# Patient Record
Sex: MALE | Race: WHITE | NOT HISPANIC OR LATINO | URBAN - METROPOLITAN AREA
[De-identification: names, ages, dates, MRNs, and addresses within clinical notes are randomized per-mention and may not be internally consistent; named-entity substitution may affect disease eponyms.]

---

## 2017-01-10 ENCOUNTER — THERAPY VISIT (OUTPATIENT)
Dept: OCCUPATIONAL THERAPY | Facility: CLINIC | Age: 57
End: 2017-01-10
Payer: OTHER MISCELLANEOUS

## 2017-01-10 DIAGNOSIS — S62.635D CLOSED DISPLACED FRACTURE OF DISTAL PHALANX OF LEFT RING FINGER WITH ROUTINE HEALING, SUBSEQUENT ENCOUNTER: ICD-10-CM

## 2017-01-10 DIAGNOSIS — M79.645 PAIN OF FINGER OF LEFT HAND: Primary | ICD-10-CM

## 2017-01-10 DIAGNOSIS — S62.633D CLOSED DISPLACED FRACTURE OF DISTAL PHALANX OF LEFT MIDDLE FINGER WITH ROUTINE HEALING, SUBSEQUENT ENCOUNTER: ICD-10-CM

## 2017-01-10 DIAGNOSIS — M25.642 STIFFNESS OF LEFT HAND JOINT: ICD-10-CM

## 2017-01-10 PROCEDURE — 97165 OT EVAL LOW COMPLEX 30 MIN: CPT | Mod: GO | Performed by: OCCUPATIONAL THERAPIST

## 2017-01-10 PROCEDURE — 97110 THERAPEUTIC EXERCISES: CPT | Mod: GO | Performed by: OCCUPATIONAL THERAPIST

## 2017-01-10 NOTE — PROGRESS NOTES
"Hand Therapy Initial Evaluation    Current Date:  1/10/2017    Subjective:  Hugh (\"Rishi\") Catrachita is a 56 year old left hand dominant male.    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 Since onset symptoms are gradually getting better.  Special tests:  x-ray.  Previous treatment: Stack splints x 2 months.  General health as reported by patient is fair.  Pertinent medical history includes:smoking  Medical allergies:none.  Surgical history: other: left index finger nerve repair 2008.  Medication history: pain.    Current occupation is    Currently working in normal job with restrictions  Job Tasks: prolonged standing, driving, lifting/carrying, pushing/pulling  Barriers include:none  Prior functional level:  no limitations    Additional Occupational Profile Information (patterns of daily living, interests, values and needs): Work restrictions of 2 pounds for lifting     Functional Outcome Measure:  Upper Extremity Functional Index  SCORE:   Column Totals: 40/80  (A lower score indicates greater disability.)      AROM(PROM) 1/10/17    Left   Long MP 0/85   PIP 0/95   DIP -5/55   Ring MP 0/75   PIP 0/100   DIP 0/50     Strength:   (Measured in pounds)    1/10/17   Trials Right Left   1  2  3 93  86  78 65+  54+  45+   Average: 86 55     3 Pt Pinch  1/10/17   Trials Right Left   1  2  3 21  20  20 14+  15+  15+   Average: 20 15     Edema:  Circumference:  (Measured in cm)  Edema  1/10/17   Location: Right Left   P2 Long 6.5 7.0   DIP Long 6.1 6.7   P2 Ring 5.9 6.1   DIP Ring 5.6 5.8     Sensation:  Decreased in tips of long and ring fingers per patient report    Pain Report:  VAS(0-10) 1/10/17   At Rest: 0/10   With Use: 4/10   Worst: 9/10   Location:  long and ring finger " tips  Pain Quality:  Sharp  Frequency: intermittent    Pain is worst:  daytime  Exacerbated by:  Gripping or bumping fingers  Relieved by:  Rest, pain meds as needed  Progression:  Gradually improving  Assessment:  Patient presents with symptoms consistent with diagnosis of crush injury, with conservative intervention.     Patient's limitations or Problem List includes:  Pain, Decreased ROM/motion, Increased edema, Weakness, Decreased  and Decreased pinch of the left long finger and ring finger which interferes with the patient's ability to perform Self Care Tasks (dressing, eating, bathing, hygiene), Work Tasks, Sleep Patterns, Recreational Activities, Household Chores and Driving  as compared to previous level of function.    Rehab Potential:  Excellent - Return to full activity, no limitations    Patient will benefit from skilled Occupational Therapy to increase ROM, flexibility, overall strength,  strength, pinch strength and sensation and decrease pain and edema to return to previous activity level and resume normal daily tasks and to reach their rehab potential.    Barriers to Learning:  No barrier    Communication Issues:  Patient appears to be able to clearly communicate and understand verbal and written communication and follow directions correctly.    Assessment of Occupational Performance:  3-5 Performance Deficits  Identified Performance Deficits: bathing, dressing, household chores , driving, sports/recreation, pushing, pulling, lifting, carrying, eating, hygiene, work, sleeping  and reaching      Clinical Decision Making (Complexity): Low complexity    Treatment Explanation:  The following has been discussed with the patient:  RX ordered/plan of care  Anticipated outcomes  Possible risks and side effects    Plan:  Frequency:  1 X week, once daily  Duration:  for 6 weeks  Treatment Plan:    Modalities:  US, Fluidotherapy and Paraffin  Therapeutic Exercise:  AROM, AAROM, PROM, Tendon Gliding,  Blocking, Place and Hold and Isotonics  Neuromuscular re-education:  Desensitization  Manual Techniques:  Joint mobilization and Manual edema mobilization  Self Care:  Self Care Tasks    Discharge Plan:  Achieve all LTG.  Independent in home treatment program.  Reach maximal therapeutic benefit.    Home Exercise Program:  Warmth for stiffness  AROM finger E/F  Tendon gliding with 3 fists  DIP blocking exercises  Gentle  and 3 point pinch strengthening with stress ball    Next Visit:  See in 1 week  Consider paraffin for stiffness  Add gentle AA/PROM for DIP flexion/hook fist  Consider digicaps or coban wrap as indicated for pain and swelling

## 2017-01-11 ENCOUNTER — TELEPHONE (OUTPATIENT)
Dept: ORTHOPEDICS | Facility: CLINIC | Age: 57
End: 2017-01-11

## 2017-01-11 NOTE — TELEPHONE ENCOUNTER
Faxed the physical therapy orders to [facility] as requested at [phone].  Yani Salas Certified Medical Assistant

## 2017-01-17 ENCOUNTER — THERAPY VISIT (OUTPATIENT)
Dept: OCCUPATIONAL THERAPY | Facility: CLINIC | Age: 57
End: 2017-01-17
Payer: OTHER MISCELLANEOUS

## 2017-01-17 DIAGNOSIS — S62.633D CLOSED DISPLACED FRACTURE OF DISTAL PHALANX OF LEFT MIDDLE FINGER WITH ROUTINE HEALING, SUBSEQUENT ENCOUNTER: ICD-10-CM

## 2017-01-17 DIAGNOSIS — M25.642 STIFFNESS OF LEFT HAND JOINT: ICD-10-CM

## 2017-01-17 DIAGNOSIS — M79.645 PAIN OF FINGER OF LEFT HAND: Primary | ICD-10-CM

## 2017-01-17 DIAGNOSIS — S62.635D CLOSED DISPLACED FRACTURE OF DISTAL PHALANX OF LEFT RING FINGER WITH ROUTINE HEALING, SUBSEQUENT ENCOUNTER: ICD-10-CM

## 2017-01-17 PROCEDURE — 97018 PARAFFIN BATH THERAPY: CPT | Mod: GO | Performed by: OCCUPATIONAL THERAPIST

## 2017-01-17 PROCEDURE — 97140 MANUAL THERAPY 1/> REGIONS: CPT | Mod: GO | Performed by: OCCUPATIONAL THERAPIST

## 2017-01-17 PROCEDURE — 97110 THERAPEUTIC EXERCISES: CPT | Mod: GO | Performed by: OCCUPATIONAL THERAPIST

## 2017-01-17 NOTE — PROGRESS NOTES
SOAP note objective information for 1/17/2017:    AROM(PROM) 1/10/17 1/17/17    Left Left   Long MP 0/85    PIP 0/95 /105   DIP -5/55 0/60   Ring MP 0/75    PIP 0/100 /105   DIP 0/50 /55     Pain Report:  VAS(0-10) 1/10/17 1/17/17   At Rest: 0/10    With Use: 4/10 3-4/10   Worst: 9/10 9/10   Location:  long and ring finger tips    Home Exercise Program:  Warmth for stiffness  AROM finger E/F  Tendon gliding with 3 fists  DIP blocking exercises  Gentle AA/PROM for IP flexion/hook fist  Gentle  and 3 point pinch strengthening with stress ball    Next Visit:  Continue paraffin and joint mobs for stiffness  Assess response to gentle AA/PROM for DIP flexion/hook fist  Consider digicaps or coban wrap as indicated for pain and swelling    Please refer to the daily flowsheet for treatment today, total treatment time and time spent performing 1:1 timed codes.

## 2017-01-25 ENCOUNTER — OFFICE VISIT (OUTPATIENT)
Dept: ORTHOPEDICS | Facility: CLINIC | Age: 57
End: 2017-01-25
Payer: OTHER MISCELLANEOUS

## 2017-01-25 ENCOUNTER — RADIANT APPOINTMENT (OUTPATIENT)
Dept: GENERAL RADIOLOGY | Facility: CLINIC | Age: 57
End: 2017-01-25
Attending: ORTHOPAEDIC SURGERY
Payer: OTHER MISCELLANEOUS

## 2017-01-25 VITALS — RESPIRATION RATE: 16 BRPM | BODY MASS INDEX: 24.78 KG/M2 | WEIGHT: 177 LBS | HEIGHT: 71 IN

## 2017-01-25 DIAGNOSIS — S62.635D CLOSED DISPLACED FRACTURE OF DISTAL PHALANX OF LEFT RING FINGER WITH ROUTINE HEALING, SUBSEQUENT ENCOUNTER: ICD-10-CM

## 2017-01-25 DIAGNOSIS — S62.633D CLOSED DISPLACED FRACTURE OF DISTAL PHALANX OF LEFT MIDDLE FINGER WITH ROUTINE HEALING, SUBSEQUENT ENCOUNTER: Primary | ICD-10-CM

## 2017-01-25 DIAGNOSIS — S62.633D CLOSED DISPLACED FRACTURE OF DISTAL PHALANX OF LEFT MIDDLE FINGER WITH ROUTINE HEALING, SUBSEQUENT ENCOUNTER: ICD-10-CM

## 2017-01-25 PROCEDURE — 73140 X-RAY EXAM OF FINGER(S): CPT | Mod: LT

## 2017-01-25 PROCEDURE — 99213 OFFICE O/P EST LOW 20 MIN: CPT | Performed by: ORTHOPAEDIC SURGERY

## 2017-01-25 ASSESSMENT — PAIN SCALES - GENERAL: PAINLEVEL: NO PAIN (0)

## 2017-01-25 NOTE — NURSING NOTE
"Chief Complaint   Patient presents with     RECHECK     WORK COMP. Left long and ring finger distal phalanx crush injury/fractures. DOI: 10/31/16, 3 month s/p. Patient states his fingers still hurt and long finger is constantly numb. Fingers throb when it is cold out. Notes he still has some swelling.        Initial Resp 16  Ht 1.803 m (5' 11\")  Wt 80.287 kg (177 lb)  BMI 24.70 kg/m2 Estimated body mass index is 24.7 kg/(m^2) as calculated from the following:    Height as of this encounter: 1.803 m (5' 11\").    Weight as of this encounter: 80.287 kg (177 lb).  BP completed using cuff size: NA (Not Taken)  Yani Salas Certified Medical Assistant    "

## 2017-01-25 NOTE — PATIENT INSTRUCTIONS
Please remember to call and schedule a follow up appointment if one was recommended at your earliest convenience.  Orthopedics CLINIC HOURS TELEPHONE NUMBER   Dr. Tonya Lomeli  Certified Medical Assistant   Monday & Wednesday   8am - 5pm  Thursday 1pm - 5pm  Friday 8am -11:30am Specialty schedulers:   [phone] to schedule your surgery.  Main Clinic:   [phone] to make an appointment with any provider.    Urgent Care locations:    [facility] Monday-Friday Closed  Saturday-Sunday 9am-5pm      Monday-Friday 12pm - 8pm  Saturday-Sunday 9am-5pm [phone]     If SURGERY has been recommended, please call our Specialty Schedulers at [phone] to schedule your procedure.    If you need a medication refill, please contact your pharmacy. Please allow 3 business days for your refill to be completed.    If an MRI or CT scan has been recommended, please call [facility] Schedulers at [phone] to schedule your appointment.  Use [employer] (secure e-mail communication and access to your chart) to send a message or to make an appointment. Please ask how you can sign up for [employer].  Your care team's suggested websites for health information:   [email or web address] : Up to date and easily searchable information on multiple topics.   [email or web address] : [place] dept of heat, public health issues in [place], N1N1

## 2017-01-25 NOTE — Clinical Note
[address] [address]  [phone]      WORKABILITY    [facility], Dr. Harrison Castaneda M.D., ANNE MARIE [place], [place]        1/25/2017      RE: Hugh Domínguez    [address]  [facility] [address]        To whom it may concern:     Hugh Domínguez is under my professional care for   1. Closed displaced fracture of distal phalanx of left middle finger with routine healing, subsequent encounter    2. Closed displaced fracture of distal phalanx of left ring finger with routine healing, subsequent encounter         Work related injury: Yes. Date of injury: 10/31/16.     Mr. Domínguez can return to work WITH RESTRICTIONS: 25lb lifting restrictions with left upper extremity. DURATION OF LIMITATIONS: 8 weeks.      Next appointment: 8 weeks        Wale Narvaez PA-C, CAQ (Ortho)  Supervising Physician: Harrison Castaneda M.D., M.S.  Dept. of Orthopaedic Surgery  [facility]

## 2017-01-25 NOTE — PROGRESS NOTES
Chief Complaint:   Chief Complaint   Patient presents with     RECHECK     WORK COMP. Left long and ring finger distal phalanx crush injury/fractures. DOI: 10/31/16, 3 month s/p. Patient states his fingers still hurt and long finger is constantly numb. Fingers throb when it is cold out. Notes he still has some swelling.         HPI: Hugh Domínguez is a 55 year old male , left -hand dominant, who presents for followup evaluation and management of a left long (middle) and ring finger injury. He injured his hand on 10/31/16 while at work. 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 Pain and swelling occurred immediately. He was evaluated at  on the day of injury. X-rays were taken, showing nondisplaced fractures of the long and ring fingers. He was given splints and Norco. It has been 3 months since the initial injury. Pain is rated a 0/10 today, up to 3-4/10 with a fist or activities. Swelling has slightly improved. Since going back to work, patient states when using his hand he does still have pain. He states his workplace has chosen to not abide by the workability restrictions. Does have numbness and tingling on the tip of the long finger.    He reports having mild pain/discomfort around the injury site.     Symptoms: mild pain, mild swelling, stiffness.  Location: left long and ring fingers.  Pain severity: 0/10, up to 3-4/10  Pain quality: sharp, throbbing.  Frequency of symptoms: are intermittent  Aggravating factors: pressure when using the fingers, especially the tips.  Relieving factors: rest.    Previous treatment: splints, Norco    Past medical history:    Patient Active Problem List    Diagnosis Date Noted     Pain of finger of left hand 01/10/2017     Priority: Medium     Stiffness of left hand joint 01/10/2017     Priority: Medium     Closed displaced fracture of distal phalanx of left middle finger with routine healing, subsequent encounter  "01/10/2017     Priority: Medium     Closed displaced fracture of distal phalanx of left ring finger with routine healing, subsequent encounter 01/10/2017     Priority: Medium     Closed nondisplaced fracture of distal phalanx of left middle finger, initial encounter 11/03/2016     Priority: Medium       Past surgical history:  has no past surgical history on file.     Medications:    Current Outpatient Prescriptions   Medication Sig Dispense Refill     cefUROXime (CEFTIN) 500 MG tablet Take 1 tablet (500 mg) by mouth 2 times daily 20 tablet 0     HYDROcodone-acetaminophen (NORCO) 5-325 MG per tablet Take 1-2 tablets by mouth every 4 hours as needed for moderate to severe pain (maximum    tablet(s) per day) 20 tablet 0        Allergies:   No Known Allergies     Family History: family history is not on file.     Social History: semi-.  reports that he has been smoking.  He does not have any smokeless tobacco history on file.    Review of Systems:   Denies numbness, tingling, parasthesias.   Denies headaches.   Denies fevers, chills, night sweats   Denies chest pain.   Denies shortness of breath.   Denies any skin problems, abrasions, rashes, irritation.      Physical Exam  GENERAL APPEARANCE: healthy, alert, no distress.   SKIN: no suspicious lesions or rashes  NEURO: Normal strength and tone, mentation intact and speech normal  PSYCH:  mentation appears normal and affect normal. Not anxious.  RESPIRATORY: No increased work of breathing.    Resp 16  Ht 1.803 m (5' 11\")  Wt 80.287 kg (177 lb)  BMI 24.70 kg/m2     left HAND EXAM:    There is mild swelling of the long (middle) finger.   Skin intact, no abrasions or scabs.  There is mild tenderness between the PIP and DIP joints of the long finger.  Nontender to palpation of the other fingers, including the ring and index fingers.  There is no ecchymosis.  There is no erythema of the surrounding skin.  There is no maceration of the skin.  There is no " deformity in the area.  Range of motion: stiff, and (any)movements are painful at extreme forced DIP flexion.  Intact sensation median, radial, ulnar nerves of the hand, and intact sensation to light touch radial and ulnar digital nerves to all fingers except long finger. Decreased sensation of tip of long finger.  Brisk capillary refill to all fingers.   Palpable radial pulse, 2+  Intact finger flexors and extensors.    X-rays:  3 views left long and ring fingers 1/25/2017 were reviewed personally in clinic today. On my review of the Xrays, the ring finger distal phalanx fracture lucency is no longer visible, consistent with healing. The transverse fracture of the long finger distal phalanx remains with lucency, but decreased. There is sclerosis and remodeling.    Impression:  55 year old male with left hand crush injury, non-displaced distal phalanx fractures of the long and ring fingers.    Plan:  * reviewed xrays with patient. Based on the xrays, the fractures are nondisplaced and in stable alignment. clinically healed. Xray healing lags clinical healing.  * immobilization: none. Continue working on range of motion at this time. Finger massage.  * rest  * ice as needed  * elevate  * Tylenol or ibuprofen as needed for pain  * Hand therapy exercises as taught by therapist.  * workability update: Will allow him to go back to work with light duties of right hand 25 lb lifting restrictions.  * return to clinic in 8 weeks, with new x-rays of the left hand, likely will be last visit.    * additionally discussed that it may take up to a year for pain, swelling, numbness to improve. With that, may always have some pain in the finger, notably the tips. Numbness also may be longstanding. Will monitor.    This document serves as a record of the services and decisions personally performed and made by Harrison Castaneda MD. It was created on his behalf by Elayne Lindsay, a trained medical scribe. The creation of this document is based  the provider's statements to the medical scribe.    Kei Lindsay 2:04 PM 1/25/2017       Harrison Castaneda M.D., M.S.  Dept. of Orthopaedic Surgery  [facility]

## 2017-01-25 NOTE — MR AVS SNAPSHOT
After Visit Summary   1/25/2017    Hugh Domínguez    MRN: [id number]           Patient Information     Date Of Birth          1960        Visit Information        Provider Department      1/25/2017 1:30 PM Harrison Castaneda MD [facility]        Today's Diagnoses     Closed displaced fracture of distal phalanx of left middle finger with routine healing, subsequent encounter    -  1     Closed displaced fracture of distal phalanx of left ring finger with routine healing, subsequent encounter           Care Instructions    Please remember to call and schedule a follow up appointment if one was recommended at your earliest convenience.  Orthopedics CLINIC HOURS TELEPHONE NUMBER   Dr. Tonya Lomeli  Certified Medical Assistant   Monday & Wednesday   8am - 5pm  Thursday 1pm - 5pm  Friday 8am -11:30am Specialty schedulers:   [phone] to schedule your surgery.  Main Clinic:   [phone] to make an appointment with any provider.    Urgent Care locations:    [facility] Monday-Friday Closed  Saturday-Sunday 9am-5pm      Monday-Friday 12pm - 8pm  Saturday-Sunday 9am-5pm [phone]     If SURGERY has been recommended, please call our Specialty Schedulers at [phone] to schedule your procedure.    If you need a medication refill, please contact your pharmacy. Please allow 3 business days for your refill to be completed.    If an MRI or CT scan has been recommended, please call [facility] Schedulers at [phone] to schedule your appointment.  Use [employer] (secure e-mail communication and access to your chart) to send a message or to make an appointment. Please ask how you can sign up for [employer].  Your care team's suggested websites for health information:   [email or web address] : Up to date and easily searchable information on multiple topics.   [email or web address] : [place] dept of heatl, public health  "issues in [place], N1N1            Follow-ups after your visit        Your next 10 appointments already scheduled     2017  5:00 PM   SHAUN Hand with Rox Kelley   [facility] [place] ([facility] [place] [place])    [address] [facility] [address]  [place] [id number] [phone]              Who to contact     If you have questions or need follow up information about today's clinic visit or your schedule please contact [facility] directly at [phone].  Normal or non-critical lab and imaging results will be communicated to you by [employer], letter or phone within 4 business days after the clinic has received the results. If you do not hear from us within 7 days, please contact the clinic through [employer] or phone. If you have a critical or abnormal lab result, we will notify you by phone as soon as possible.  Submit refill requests through [employer] or call your pharmacy and they will forward the refill request to us. Please allow 3 business days for your refill to be completed.          Additional Information About Your Visit        MyChart Information     [employer] lets you send messages to your doctor, view your test results, renew your prescriptions, schedule appointments and more. To sign up, go to [email or web address] . Click on \"Log in\" on the left side of the screen, which will take you to the Welcome page. Then click on \"Sign up Now\" on the right side of the page.     You will be asked to enter the access code listed below, as well as some personal information. Please follow the directions to create your username and password.     Your access code is: [email or web address]  Expires: 2017  2:28 PM     Your access code will  in 90 days. If you need help or a new code, please call your [facility] or [phone].        Care EveryWhere ID     This is your Care EveryWhere ID. This could be used by other organizations to access your [place] " "medical records  [id number]        Your Vitals Were     Respirations Height BMI (Body Mass Index)             16 1.803 m (5' 11\") 24.70 kg/m2          Blood Pressure from Last 3 Encounters:   10/31/16 148/92    Weight from Last 3 Encounters:   01/25/17 80.287 kg (177 lb)   12/28/16 78.926 kg (174 lb)   11/30/16 79.379 kg (175 lb)               Primary Care Provider    None Specified       No primary provider on file.        Thank you!     Thank you for choosing [facility]  for your care. Our goal is always to provide you with excellent care. Hearing back from our patients is one way we can continue to improve our services. Please take a few minutes to complete the written survey that you may receive in the mail after your visit with us. Thank you!             Your Updated Medication List - Protect others around you: Learn how to safely use, store and throw away your medicines at www.disposemymeds.org.          This list is accurate as of: 1/25/17  1:58 PM.  Always use your most recent med list.                   Brand Name Dispense Instructions for use    cefUROXime 500 MG tablet    CEFTIN    20 tablet    Take 1 tablet (500 mg) by mouth 2 times daily       HYDROcodone-acetaminophen 5-325 MG per tablet    NORCO    40 tablet    Take 1 tablet by mouth every 6 hours as needed for moderate to severe pain (maximum 10 tablet(s) per day)         "

## 2017-02-02 ENCOUNTER — THERAPY VISIT (OUTPATIENT)
Dept: OCCUPATIONAL THERAPY | Facility: CLINIC | Age: 57
End: 2017-02-02
Payer: OTHER MISCELLANEOUS

## 2017-02-02 DIAGNOSIS — S62.635D CLOSED DISPLACED FRACTURE OF DISTAL PHALANX OF LEFT RING FINGER WITH ROUTINE HEALING, SUBSEQUENT ENCOUNTER: ICD-10-CM

## 2017-02-02 DIAGNOSIS — S62.633D CLOSED DISPLACED FRACTURE OF DISTAL PHALANX OF LEFT MIDDLE FINGER WITH ROUTINE HEALING, SUBSEQUENT ENCOUNTER: ICD-10-CM

## 2017-02-02 DIAGNOSIS — M79.645 PAIN OF FINGER OF LEFT HAND: Primary | ICD-10-CM

## 2017-02-02 DIAGNOSIS — M25.642 STIFFNESS OF LEFT HAND JOINT: ICD-10-CM

## 2017-02-02 PROCEDURE — 97140 MANUAL THERAPY 1/> REGIONS: CPT | Mod: GO | Performed by: OCCUPATIONAL THERAPIST

## 2017-02-02 PROCEDURE — 97110 THERAPEUTIC EXERCISES: CPT | Mod: GO | Performed by: OCCUPATIONAL THERAPIST

## 2017-02-02 NOTE — PROGRESS NOTES
SOAP note objective information for 2/2/2017:    ROM:  AROM(PROM) 1/10/17 1/17/17 2/2/17    Left Left Left   Long MP 0/85     PIP 0/95 /105 /105   DIP  After Tx -5/55 0/60 /60  /70   Ring MP 0/75     PIP 0/100 /105 /100   DIP  After Tx 0/50 /55 /60  /65     Strength:   (Measured in pounds)    1/10/17 2/2/17   Trials Right Left Left   1  2  3 93  86  78 65+  54+  45+ 62-  56-  57-   Average: 86 55 58     Pain Report:  VAS(0-10) 1/10/17 1/17/17 2/2/17   At Rest: 0/10     With Use: 4/10 3-4/10 3-4/10   Worst: 9/10 9/10 9/10   Location:  long and ring finger tips    Home Exercise Program:  Warmth for stiffness  AROM finger E/F  Tendon gliding with 3 fists  DIP blocking exercises  AA/PROM for IP flexion/hook fist  Strengthening in hook fist,  and 3 point pinch strengthening with stress ball or devin    Next Visit:  See 1 more visit in 2 weeks  Progress Report and UEFI  D/C to HEP if doing well    Please refer to the daily flowsheet for treatment today, total treatment time and time spent performing 1:1 timed codes.

## 2017-02-22 ENCOUNTER — THERAPY VISIT (OUTPATIENT)
Dept: OCCUPATIONAL THERAPY | Facility: CLINIC | Age: 57
End: 2017-02-22
Payer: OTHER MISCELLANEOUS

## 2017-02-22 DIAGNOSIS — M79.645 PAIN OF FINGER OF LEFT HAND: ICD-10-CM

## 2017-02-22 DIAGNOSIS — S62.633D CLOSED DISPLACED FRACTURE OF DISTAL PHALANX OF LEFT MIDDLE FINGER WITH ROUTINE HEALING, SUBSEQUENT ENCOUNTER: ICD-10-CM

## 2017-02-22 DIAGNOSIS — S62.635D CLOSED DISPLACED FRACTURE OF DISTAL PHALANX OF LEFT RING FINGER WITH ROUTINE HEALING, SUBSEQUENT ENCOUNTER: ICD-10-CM

## 2017-02-22 DIAGNOSIS — M25.642 STIFFNESS OF LEFT HAND JOINT: ICD-10-CM

## 2017-02-22 PROCEDURE — 97110 THERAPEUTIC EXERCISES: CPT | Mod: GO | Performed by: OCCUPATIONAL THERAPIST

## 2017-02-22 PROCEDURE — 97140 MANUAL THERAPY 1/> REGIONS: CPT | Mod: GO | Performed by: OCCUPATIONAL THERAPIST

## 2017-02-22 NOTE — PROGRESS NOTES
Hand Therapy Progress/Discharge Note    Current Date:  2/22/2017    Reporting period is 1/10/2017 to 2/22/2017    Diagnosis:   Left long and ring finger distal phalanx fractures  DOI:  10/31/16    Subjective:   Subjective changes noted by patient:  Fingers are doing pretty good.  My back is giving me a lot of trouble so I don't think much about my hand.  Functional changes noted by patient:  Improvement in Household Chores  Patient has noted adverse reaction to:  None    Functional Outcome Measure:  Upper Extremity Functional Index  SCORE:   Column Totals: 51/80  (A lower score indicates greater disability.)    Objective:  ROM:  AROM(PROM) 1/10/17 2/22/17    Left Left   Long MP 0/85 0/85   PIP 0/95 0/100   DIP -5/55 -5/65   Ring MP 0/75 0/75   PIP 0/100 0/105   DIP 0/50 0/60     Strength:   (Measured in pounds)    1/10/17 2/22/17   Trials Right Left Left   1  2  3 93  86  78 65+  54+  45+ 65  58  62   Average: 86 55 62     3 Pt Pinch  1/10/17 2/22/17   Trials Right Left Left   1  2  3 21  20  20 14+  15+  15+ 15+  14+  15+   Average: 20 15 15     Edema:  Circumference:  (Measured in cm)  Edema  1/10/17 2/22/17   Location: Right Left Left   P2 Long 6.5 7.0 6.6   DIP Long 6.1 6.7 6.5   P2 Ring 5.9 6.1 5.8   DIP Ring 5.6 5.8 5.7     Sensation:  Decreased in tips of long finger, ring finger now WNLs per patient report    Pain Report:  VAS(0-10) 1/10/17 2/22/17   At Rest: 0/10    With Use: 4/10 3/10   Worst: 9/10 5/10   Location:  long and ring finger tips    Please refer to the daily flowsheet for treatment provided today.     Assessment:  Response to therapy has been improvement to:  ROM of Fingers: PIP joint - Flex, DIP joint - Flex  Strength:     Edema:  Decreased circumference  Pain:  intensity of pain is decreased  Response to therapy has been lack of progress in:  Strength: pinch    Overall Assessment:  Patient's symptoms are resolving and patient is independent in home exercise program  STG/LTG:  STGoals  have been reviewed and progress or achievement has occurred;  see goal sheet for details and updates.  I have re-evaluated this patient and find that the nature, scope, duration and intensity of the therapy is appropriate for the medical condition of the patient.    Plan:  Frequency/Duration:  Discharge from Hand Therapy; continue home program.    Recommendations for Continued Therapy  Home Exercise Program:  Warmth for stiffness  AROM finger E/F  Tendon gliding with 3 fists  DIP blocking exercises  AA/PROM for IP flexion/hook fist  Strengthening in hook fist,  and 3 point pinch strengthening with stress ball or devin

## 2017-02-22 NOTE — MR AVS SNAPSHOT
"              After Visit Summary   2/22/2017    Hugh Domínguez    MRN: [id number]           Patient Information     Date Of Birth          1960        Visit Information        Provider Department      2/22/2017 1:30 PM [facility]        Today's Diagnoses     Pain of finger of left hand        Stiffness of left hand joint        Closed displaced fracture of distal phalanx of left middle finger with routine healing, subsequent encounter        Closed displaced fracture of distal phalanx of left ring finger with routine healing, subsequent encounter           Follow-ups after your visit        Who to contact     If you have questions or need follow up information about today's clinic visit or your schedule please contact [facility] directly at [phone].  Normal or non-critical lab and imaging results will be communicated to you by U Grok It - Smartphone RFIDhart, letter or phone within 4 business days after the clinic has received the results. If you do not hear from us within 7 days, please contact the clinic through U Grok It - Smartphone RFIDhart or phone. If you have a critical or abnormal lab result, we will notify you by phone as soon as possible.  Submit refill requests through Frontierre or call your pharmacy and they will forward the refill request to us. Please allow 3 business days for your refill to be completed.          Additional Information About Your Visit        U Grok It - Smartphone RFIDharLiztic Information     Frontierre lets you send messages to your doctor, view your test results, renew your prescriptions, schedule appointments and more. To sign up, go to [email or web address] . Click on \"Log in\" on the left side of the screen, which will take you to the Welcome page. Then click on \"Sign up Now\" on the right side of the page.     You will be asked to enter the access code listed below, as well as some personal information. Please follow the directions to create " your username and password.     Your access code is: [email or web address]  Expires: 2017  1:56 PM     Your access code will  in 90 days. If you need help or a new code, please call your [place] clinic or [phone].        Care EveryWhere ID     This is your Care EveryWhere ID. This could be used by other organizations to access your [place] medical records  [id number]         Blood Pressure from Last 3 Encounters:   10/31/16 (!) 148/92    Weight from Last 3 Encounters:   17 80.3 kg (177 lb)   16 78.9 kg (174 lb)   16 79.4 kg (175 lb)              We Performed the Following     SHAUN PROGRESS NOTES REPORT     MANUAL THER TECH,1+REGIONS,EA 15 MIN     THERAPEUTIC EXERCISES        Primary Care Provider    None Specified       No primary provider on file.        Thank you!     Thank you for choosing [facility]  for your care. Our goal is always to provide you with excellent care. Hearing back from our patients is one way we can continue to improve our services. Please take a few minutes to complete the written survey that you may receive in the mail after your visit with us. Thank you!             Your Updated Medication List - Protect others around you: Learn how to safely use, store and throw away your medicines at www.disposemymeds.org.          This list is accurate as of: 17  1:56 PM.  Always use your most recent med list.                   Brand Name Dispense Instructions for use    cefUROXime 500 MG tablet    CEFTIN    20 tablet    Take 1 tablet (500 mg) by mouth 2 times daily       HYDROcodone-acetaminophen 5-325 MG per tablet    NORCO    40 tablet    Take 1 tablet by mouth every 6 hours as needed for moderate to severe pain (maximum 10 tablet(s) per day)

## 2017-03-22 ENCOUNTER — RADIANT APPOINTMENT (OUTPATIENT)
Dept: GENERAL RADIOLOGY | Facility: CLINIC | Age: 57
End: 2017-03-22
Attending: ORTHOPAEDIC SURGERY
Payer: OTHER MISCELLANEOUS

## 2017-03-22 ENCOUNTER — OFFICE VISIT (OUTPATIENT)
Dept: ORTHOPEDICS | Facility: CLINIC | Age: 57
End: 2017-03-22
Payer: OTHER MISCELLANEOUS

## 2017-03-22 VITALS — WEIGHT: 174 LBS | BODY MASS INDEX: 24.36 KG/M2 | RESPIRATION RATE: 16 BRPM | HEIGHT: 71 IN

## 2017-03-22 DIAGNOSIS — S62.663A CLOSED NONDISPLACED FRACTURE OF DISTAL PHALANX OF LEFT MIDDLE FINGER, INITIAL ENCOUNTER: ICD-10-CM

## 2017-03-22 DIAGNOSIS — S62.663A CLOSED NONDISPLACED FRACTURE OF DISTAL PHALANX OF LEFT MIDDLE FINGER, INITIAL ENCOUNTER: Primary | ICD-10-CM

## 2017-03-22 PROCEDURE — 99213 OFFICE O/P EST LOW 20 MIN: CPT | Performed by: ORTHOPAEDIC SURGERY

## 2017-03-22 PROCEDURE — 73130 X-RAY EXAM OF HAND: CPT | Mod: LT

## 2017-03-22 RX ORDER — METHYLPREDNISOLONE 4 MG
TABLET, DOSE PACK ORAL
Refills: 0 | COMMUNITY
Start: 2017-03-03

## 2017-03-22 ASSESSMENT — PAIN SCALES - GENERAL: PAINLEVEL: NO PAIN (0)

## 2017-03-22 NOTE — NURSING NOTE
"Chief Complaint   Patient presents with     RECHECK     WORK COMP. Left long and ring finger distal phalanx crush fx. DOI 10/31/16, 5 month s/p. Patient states his hand is ok. He still has some numbness on the palm side of the tips of the fingers. When he makes a tight fist he has some pain. He has not been using his hand much because he was not been at work since Feb. 3 due to other issues.        Initial Resp 16  Ht 1.803 m (5' 11\")  Wt 78.9 kg (174 lb)  BMI 24.27 kg/m2 Estimated body mass index is 24.27 kg/(m^2) as calculated from the following:    Height as of this encounter: 1.803 m (5' 11\").    Weight as of this encounter: 78.9 kg (174 lb).  Medication Reconciliation: complete   Yani Salas Certified Medical Assistant    "

## 2017-03-22 NOTE — LETTER
[address] [address]  [phone]  WORKABILITY    [facility], Dr. Harrison Bautista [address], [place]        3/22/2017      RE: Hugh Domínguez    [address] [facility] [address]        To whom it may concern:     Hugh Domínguez is under my care for   1. Closed nondisplaced fracture of distal phalanx of left middle finger, initial encounter         Work related injury: Yes       Date of injury: 10/31/16          Return to work date: 3/23/17     With regards to Mr. Domínguez' left hand fracture, he may return to work with no restrictions.       Next appointment: As needed        Electronically Signed (as below)  Dr. Harrison Castaneda M.D.

## 2017-03-22 NOTE — PROGRESS NOTES
Chief Complaint:   Chief Complaint   Patient presents with     RECHECK     WORK COMP. Left long and ring finger distal phalanx crush fx. DOI 10/31/16, 5 month s/p. Patient states his hand is ok. He still has some numbness on the palm side of the tips of the fingers. When he makes a tight fist he has some pain. He has not been using his hand much because he was not been at work since Feb. 3 due to other issues.         HPI: Hugh Domínguez is a 56 year old male , left -hand dominant, who presents for followup evaluation and management of a left long (middle) and ring finger injury. He injured his hand on 10/31/16 while at work. 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 Pain and swelling occurred immediately. He was evaluated at  on the day of injury. X-rays were taken, showing nondisplaced fractures of the long and ring fingers. He was given splints and Norco. It has been 5 months since the initial injury. Pain is rated a 0/10 today, mild pain with work activities. Continues to have some numbness and tingling on the palm side of the tip of the long finger. Pain is aggravated with making a tight fist. He has not been using his hand much because he has not been to work since February 3, 2017 due to a herniated disc.    Symptoms: mild pain, mild swelling, stiffness.  Location: left long finger.  Pain severity: 0/10, mild with activities  Pain quality: throbbing.  Frequency of symptoms: are intermittent  Aggravating factors: pressure when using the fingers, especially the tips.  Relieving factors: rest.    Previous treatment: splints, Norco    Past medical history:    Patient Active Problem List    Diagnosis Date Noted     Closed nondisplaced fracture of distal phalanx of left middle finger, initial encounter 11/03/2016     Priority: Medium       Past surgical history:  has no past surgical history on file.     Medications:    Current Outpatient Prescriptions  "  Medication Sig Dispense Refill     cefUROXime (CEFTIN) 500 MG tablet Take 1 tablet (500 mg) by mouth 2 times daily 20 tablet 0     HYDROcodone-acetaminophen (NORCO) 5-325 MG per tablet Take 1-2 tablets by mouth every 4 hours as needed for moderate to severe pain (maximum    tablet(s) per day) 20 tablet 0        Allergies:   No Known Allergies     Family History: family history is not on file.     Social History: semi-.  reports that he has been smoking.  He does not have any smokeless tobacco history on file.     This document serves as a record of the services and decisions personally performed and made by Harrison Castaneda MD. It was created on his behalf by Elayne Lindsay, a trained medical scribe. The creation of this document is based the provider's statements to the medical scribe.    Scribe Elayne Lindsay 2:13 PM 3/22/2017     Review of Systems:   Denies numbness, tingling, parasthesias.   Denies headaches.   Denies fevers, chills, night sweats   Denies chest pain.   Denies shortness of breath.   Denies any skin problems, abrasions, rashes, irritation.      Physical Exam  GENERAL APPEARANCE: healthy, alert, no distress.   SKIN: no suspicious lesions or rashes  NEURO: Normal strength and tone, mentation intact and speech normal  PSYCH:  mentation appears normal and affect normal. Not anxious.  RESPIRATORY: No increased work of breathing.    Resp 16  Ht 1.803 m (5' 11\")  Wt 78.9 kg (174 lb)  BMI 24.27 kg/m2     left HAND EXAM:    There is mild swelling of the long (middle) finger.   Skin intact, no abrasions or scabs.  There is mild tenderness of the distal phalanx of the long finger.  Nontender to palpation of the other fingers, including the ring and index fingers.  There is no ecchymosis.  There is no erythema of the surrounding skin.  There is no maceration of the skin.  There is no deformity in the area.  Range of motion: within normal limits. Slight stiffness at long finger DIP joint.  Intact " sensation median, radial, ulnar nerves of the hand, and intact sensation to light touch radial and ulnar digital nerves to all fingers except long finger. Decreased sensation of tip of long finger, but intact to an extent. 2-point discrimination 3mm.   Brisk capillary refill to all fingers.   Palpable radial pulse, 2+  Intact finger flexors and extensors.    X-rays:  3 views left long and ring fingers 3/22/2017 were reviewed personally in clinic today. On my review of the Xrays, the ring finger distal phalanx fracture lucency is no longer visible, consistent with healing. The fracture of the long finger distal phalanx not well seen consistent with healing.    Impression:  56 year old male with left hand crush injury, healed, non-displaced distal phalanx fractures of the long and ring fingers.    Plan:  * reviewed xrays with patient. Fractures well healed.  * range of motion improved.  * long finger looking more normal, still a little swelling. Nail plates growing out.  * continue with range of motion.  * may have some stiffness, soreness for 1-2 years, or indefinitely, especially in cold weather.  * overall sensation improved, likely to continue to improve.  * workability update: With regard to only left upper extremity, will allow him to go back to work without restrictions on 3/23/2017 .  * return to clinic as needed.    The information in this document, created by a scribe for me, accurately reflects the services I personally performed and the decisions made by me. I have reviewed and approved this document for accuracy.      Harrison Castaneda M.D., M.S.  Dept. of Orthopaedic Surgery  [facility]

## 2017-03-22 NOTE — PATIENT INSTRUCTIONS
Please remember to call and schedule a follow up appointment if one was recommended at your earliest convenience.  Orthopedics CLINIC HOURS TELEPHONE NUMBER   Dr. Tonya Lomeli  Certified Medical Assistant   Monday & Wednesday   8am - 5pm  Thursday 1pm - 5pm  Friday 8am -11:30am Specialty schedulers:   [phone] to schedule your surgery.  Main Clinic:   [phone] to make an appointment with any provider.    Urgent Care locations:    [facility] Monday-Friday Closed  Saturday-Sunday 9am-5pm      Monday-Friday 12pm - 8pm  Saturday-Sunday 9am-5pm [phone]     If SURGERY has been recommended, please call our Specialty Schedulers at [phone] to schedule your procedure.    If you need a medication refill, please contact your pharmacy. Please allow 3 business days for your refill to be completed.    If an MRI or CT scan has been recommended, please call [facility] Schedulers at [phone] to schedule your appointment.  Use Minderest (secure e-mail communication and access to your chart) to send a message or to make an appointment. Please ask how you can sign up for Minderest.  Your care team's suggested websites for health information:   Www.[email or web address] : Up to date and easily searchable information on multiple topics.   Www.health.[facility].[place].us : [place] dept of heat, public health issues in [place], N1N1

## 2017-03-22 NOTE — MR AVS SNAPSHOT
After Visit Summary   3/22/2017    Hugh Domínguez    MRN: [id number]           Patient Information     Date Of Birth          1960        Visit Information        Provider Department      3/22/2017 1:30 PM Harrison Castaneda MD [facility]        Today's Diagnoses     Closed nondisplaced fracture of distal phalanx of left middle finger, initial encounter    -  1      Care Instructions    Please remember to call and schedule a follow up appointment if one was recommended at your earliest convenience.  Orthopedics CLINIC HOURS TELEPHONE NUMBER   Dr. Tonya Lomeli  Certified Medical Assistant   Monday & Wednesday   8am - 5pm  Thursday 1pm - 5pm  Friday 8am -11:30am Specialty schedulers:   [phone] to schedule your surgery.  Main Clinic:   [phone] to make an appointment with any provider.    Urgent Care locations:    [facility] Monday-Friday Closed  Saturday-Sunday 9am-5pm      Monday-Friday 12pm - 8pm  Saturday-Sunday 9am-5pm [phone]     If SURGERY has been recommended, please call our Specialty Schedulers at [phone] to schedule your procedure.    If you need a medication refill, please contact your pharmacy. Please allow 3 business days for your refill to be completed.    If an MRI or CT scan has been recommended, please call [facility] Schedulers at [phone] to schedule your appointment.  Use BitGravity (secure e-mail communication and access to your chart) to send a message or to make an appointment. Please ask how you can sign up for BitGravity.  Your care team's suggested websites for health information:   Www.[email or web address] : Up to date and easily searchable information on multiple topics.   Www.health.[facility].[place].us : [place] dept of heat, public health issues in [place], N1N1            Follow-ups after your visit        Who to contact     If you have questions or need follow up information about  "today's clinic visit or your schedule please contact [facility] [place] directly at [phone].  Normal or non-critical lab and imaging results will be communicated to you by Zolvershart, letter or phone within 4 business days after the clinic has received the results. If you do not hear from us within 7 days, please contact the clinic through Zolvershart or phone. If you have a critical or abnormal lab result, we will notify you by phone as soon as possible.  Submit refill requests through Plainmark or call your pharmacy and they will forward the refill request to us. Please allow 3 business days for your refill to be completed.          Additional Information About Your Visit        ZolversharPimovation Information     Plainmark lets you send messages to your doctor, view your test results, renew your prescriptions, schedule appointments and more. To sign up, go to [email or web address] . Click on \"Log in\" on the left side of the screen, which will take you to the Welcome page. Then click on \"Sign up Now\" on the right side of the page.     You will be asked to enter the access code listed below, as well as some personal information. Please follow the directions to create your username and password.     Your access code is: [email or web address]  Expires: 2017  2:56 PM     Your access code will  in 90 days. If you need help or a new code, please call your [place] clinic or [phone].        Care EveryWhere ID     This is your Care EveryWhere ID. This could be used by other organizations to access your [place] medical records  [id number]        Your Vitals Were     Respirations Height BMI (Body Mass Index)             16 1.803 m (5' 11\") 24.27 kg/m2          Blood Pressure from Last 3 Encounters:   10/31/16 (!) 148/92    Weight from Last 3 Encounters:   17 78.9 kg (174 lb)   17 80.3 kg (177 lb)   16 78.9 kg (174 lb)               Primary Care Provider    No Pcp Confirmed       No address on file   "      Thank you!     Thank you for choosing [facility]  for your care. Our goal is always to provide you with excellent care. Hearing back from our patients is one way we can continue to improve our services. Please take a few minutes to complete the written survey that you may receive in the mail after your visit with us. Thank you!             Your Updated Medication List - Protect others around you: Learn how to safely use, store and throw away your medicines at www.disposemymeds.org.          This list is accurate as of: 3/22/17  2:03 PM.  Always use your most recent med list.                   Brand Name Dispense Instructions for use    cefUROXime 500 MG tablet    CEFTIN    20 tablet    Take 1 tablet (500 mg) by mouth 2 times daily       HYDROcodone-acetaminophen 5-325 MG per tablet    NORCO    40 tablet    Take 1 tablet by mouth every 6 hours as needed for moderate to severe pain (maximum 10 tablet(s) per day)       methylPREDNISolone 4 MG tablet    MEDROL DOSEPAK

## 2019-02-05 ASSESSMENT — MIFFLIN-ST. JEOR: SCORE: 1744.32

## 2019-02-17 ENCOUNTER — ANESTHESIA - HEALTHEAST (OUTPATIENT)
Dept: SURGERY | Facility: HOSPITAL | Age: 59
End: 2019-02-17

## 2019-02-18 ENCOUNTER — SURGERY - HEALTHEAST (OUTPATIENT)
Dept: SURGERY | Facility: HOSPITAL | Age: 59
End: 2019-02-18

## 2019-02-18 ASSESSMENT — MIFFLIN-ST. JEOR: SCORE: 1744.32

## 2021-06-02 VITALS — BODY MASS INDEX: 28 KG/M2 | HEIGHT: 71 IN | WEIGHT: 200 LBS

## 2021-06-24 NOTE — ANESTHESIA POSTPROCEDURE EVALUATION
Patient: Hugh Domínguez  ANTERIOR/POSTERIOR FUSION LUMBAR 5-SACRAL 1 BILATERAL, DECOMPRESSION LUMBAR 5-SACRAL 1, DECOMPRESSION LEFT LUMBAR 4-LUMBAR 5, EXPOSURE AND CLOSURE, LUMBAR ANTERIOR APPROACH, FOR SPINAL SURGERY, BY GENERAL SURGERY  Anesthesia type: general    Patient location: PACU  Last vitals:   Vitals:    02/18/19 1245   BP: (P) 121/76   Pulse: (P) 80   Resp: (P) 16   Temp: (P) 36.4  C (97.5  F)   SpO2: (P) 94%     Post vital signs: stable  Level of consciousness: awake and responds to simple questions  Post-anesthesia pain: pain controlled  Post-anesthesia nausea and vomiting: no  Pulmonary: unassisted, return to baseline  Cardiovascular: stable and blood pressure at baseline  Hydration: adequate  Anesthetic events: no    QCDR Measures:  ASA# 11 - Sasha-op Cardiac Arrest: ASA11B - Patient did NOT experience unanticipated cardiac arrest  ASA# 12 - Sasha-op Mortality Rate: ASA12B - Patient did NOT die  ASA# 13 - PACU Re-Intubation Rate: ASA13B - Patient did NOT require a new airway mgmt  ASA# 10 - Composite Anes Safety: ASA10A - No serious adverse event    Additional Notes:

## 2021-06-24 NOTE — ANESTHESIA PREPROCEDURE EVALUATION
Anesthesia Evaluation      Patient summary reviewed     Airway   Mallampati: II   Pulmonary - normal exam   (+) a smoker    ROS comment: Smoker 1ppd                         Cardiovascular   (+) hypertension, ,     ECG reviewed        Neuro/Psych      Comments: Lumbar radiculopathy    Endo/Other - negative ROS      GI/Hepatic/Renal - negative ROS      Other findings: Hb 17.2, K 4.3      Dental - normal exam                        Anesthesia Plan  Planned anesthetic: general endotracheal  Ketamine 50 mg IV, Mg sulfate drip for post-op pain  ASA 2   Induction: intravenous   Anesthetic plan and risks discussed with: patient    Post-op plan: routine recovery

## 2021-06-24 NOTE — ANESTHESIA CARE TRANSFER NOTE
Last vitals:   Vitals:    02/18/19 1104   BP: 135/88   Pulse: 94   Resp: 12   Temp: 36.4  C (97.6  F)   SpO2: 99%     Patient's level of consciousness is drowsy  Spontaneous respirations: yes  Maintains airway independently: yes  Dentition unchanged: yes  Oropharynx: oral airway in place    QCDR Measures:  ASA# 20 - Surgical Safety Checklist: WHO surgical safety checklist completed prior to induction    PQRS# 430 - Adult PONV Prevention: 4558F - Pt received => 2 anti-emetic agents (different classes) preop & intraop  ASA# 8 - Peds PONV Prevention: NA - Not pediatric patient, not GA or 2 or more risk factors NOT present  PQRS# 424 - Sasha-op Temp Management: 4559F - At least one body temp DOCUMENTED => 35.5C or 95.9F within required timeframe  PQRS# 426 - PACU Transfer Protocol: - Transfer of care checklist used  ASA# 14 - Acute Post-op Pain: ASA14B - Patient did NOT experience pain >= 7 out of 10